# Patient Record
Sex: MALE | Race: OTHER | HISPANIC OR LATINO | URBAN - METROPOLITAN AREA
[De-identification: names, ages, dates, MRNs, and addresses within clinical notes are randomized per-mention and may not be internally consistent; named-entity substitution may affect disease eponyms.]

---

## 2017-09-23 ENCOUNTER — EMERGENCY (EMERGENCY)
Facility: HOSPITAL | Age: 57
LOS: 0 days | Discharge: HOME | End: 2017-09-23
Attending: EMERGENCY MEDICINE
Payer: SELF-PAY

## 2017-09-23 VITALS
HEIGHT: 66 IN | HEART RATE: 99 BPM | SYSTOLIC BLOOD PRESSURE: 156 MMHG | WEIGHT: 169.98 LBS | OXYGEN SATURATION: 99 % | RESPIRATION RATE: 19 BRPM | DIASTOLIC BLOOD PRESSURE: 87 MMHG | TEMPERATURE: 98 F

## 2017-09-23 DIAGNOSIS — M79.662 PAIN IN LEFT LOWER LEG: ICD-10-CM

## 2017-09-23 DIAGNOSIS — M54.16 RADICULOPATHY, LUMBAR REGION: ICD-10-CM

## 2017-09-23 DIAGNOSIS — M54.5 LOW BACK PAIN: ICD-10-CM

## 2017-09-23 PROCEDURE — 99284 EMERGENCY DEPT VISIT MOD MDM: CPT

## 2017-09-23 PROCEDURE — 72100 X-RAY EXAM L-S SPINE 2/3 VWS: CPT | Mod: 26

## 2017-09-23 RX ORDER — METHOCARBAMOL 500 MG/1
1500 TABLET, FILM COATED ORAL ONCE
Qty: 0 | Refills: 0 | Status: COMPLETED | OUTPATIENT
Start: 2017-09-23 | End: 2017-09-23

## 2017-09-23 RX ORDER — METHOCARBAMOL 500 MG/1
1 TABLET, FILM COATED ORAL
Qty: 15 | Refills: 0 | OUTPATIENT
Start: 2017-09-23 | End: 2017-09-28

## 2017-09-23 RX ORDER — KETOROLAC TROMETHAMINE 30 MG/ML
60 SYRINGE (ML) INJECTION ONCE
Qty: 0 | Refills: 0 | Status: DISCONTINUED | OUTPATIENT
Start: 2017-09-23 | End: 2017-09-23

## 2017-09-23 RX ADMIN — Medication 60 MILLIGRAM(S): at 22:57

## 2017-09-23 RX ADMIN — METHOCARBAMOL 1500 MILLIGRAM(S): 500 TABLET, FILM COATED ORAL at 22:19

## 2017-09-23 RX ADMIN — Medication 60 MILLIGRAM(S): at 22:19

## 2017-09-23 NOTE — ED PROVIDER NOTE - MUSCULOSKELETAL MINIMAL EXAM
motor intact/Left para lumbar and sacral muscles at levels of L3 to S1/TENDERNESS/MUSCLE SPASMS/normal range of motion/neck supple

## 2017-09-23 NOTE — ED ADULT NURSE NOTE - ADDITIONAL PRINTED INSTRUCTIONS GIVEN
discharged home, instructed to follow up with Dr Quinn phillips,, verbalized understanding of instructions

## 2017-09-23 NOTE — ED ADULT TRIAGE NOTE - CHIEF COMPLAINT QUOTE
Pt states he is sick, he is having pain in his left flank and pain down his leg into his foot causing numbness and tingling. My back, my hip, my leg, my toes, my foot just hurt.  ID [id number]

## 2017-09-23 NOTE — ED PROVIDER NOTE - OBJECTIVE STATEMENT
57 years old male walked in c/o left lower back pain radiates to left buttock and left leg for 10 days increases to get up. Pt denies recent trauma, focal/distal weakness, uncontrolled urinations or bowel movements, abd pain, nausea, vomiting, dysuria or hematuria.

## 2017-09-23 NOTE — ED ADULT NURSE NOTE - CHIEF COMPLAINT QUOTE
Pt states he is sick, he is having pain in his left flank and pain down his leg into his foot causing numbness and tingling. My back, my hip, my leg, my toes, my foot just hurt.  ID [id number]

## 2017-09-23 NOTE — ED PROVIDER NOTE - WEIGHT BEARING
pt is ambulating with normal gaits without assists, pt is able to perform leg raising on standing position bilaterally/able

## 2017-09-23 NOTE — ED ADULT NURSE NOTE - OBJECTIVE STATEMENT
patient stated that back pain started for about 10 days got worse today, pain of 10 on a scale of 0 to 10

## 2017-09-23 NOTE — ED PROVIDER NOTE - CONSTITUTIONAL, MLM
normal... Well appearing, well nourished, awake, alert, oriented to person, place, time/situation and in no apparent distress. Speaking in clear full sentences smiling not holding his back .

## 2022-08-25 NOTE — ED PROVIDER NOTE - CALF TENDERNESS
Health Maintenance Due   Topic Date Due   • Hepatitis B Vaccine (1 of 3 - 3-dose series) Never done   • Colorectal Cancer Screen-  Never done   • Shingles Vaccine (1 of 2) Never done       Patient is due for topics as listed above but is not proceeding with Immunization(s) Hep B and Shingles and Colorectal Cancer Screening: Colonoscopy at this time. Education provided for Immunization(s) Hep B and Shingles and Colorectal Cancer Screening: Colonoscopy.    Patient has given consent to record this visit for documentation in their clinical record.     none

## 2023-01-03 ENCOUNTER — OUTPATIENT (OUTPATIENT)
Dept: OUTPATIENT SERVICES | Facility: HOSPITAL | Age: 63
LOS: 1 days | End: 2023-01-03

## 2023-01-03 ENCOUNTER — APPOINTMENT (OUTPATIENT)
Age: 63
End: 2023-01-03
Payer: SELF-PAY

## 2023-01-03 VITALS
HEART RATE: 90 BPM | BODY MASS INDEX: 31.76 KG/M2 | DIASTOLIC BLOOD PRESSURE: 86 MMHG | WEIGHT: 186 LBS | OXYGEN SATURATION: 97 % | RESPIRATION RATE: 14 BRPM | SYSTOLIC BLOOD PRESSURE: 127 MMHG | HEIGHT: 64 IN

## 2023-01-03 PROCEDURE — 99203 OFFICE O/P NEW LOW 30 MIN: CPT

## 2023-01-03 NOTE — PLAN
[FreeTextEntry1] : 63 y/o m. here for annual visit.\par \par B/L knee pain, most likely OA: Patient given acetaminophen 325 mg as needed  20 tablets given.\par \par Healthy eating, exercise, safe sex.\par Labs today\par \par F/U in 2 weeks.

## 2023-01-03 NOTE — REVIEW OF SYSTEMS
[Joint Pain] : joint pain [Negative] : Heme/Lymph [Patient Intake Form Reviewed] : Patient intake form was reviewed [FreeTextEntry9] : on b/l knee only

## 2023-01-03 NOTE — PHYSICAL EXAM
[Normal] : no posterior cervical lymphadenopathy and no anterior cervical lymphadenopathy [de-identified] : left knee deformity

## 2023-01-03 NOTE — HISTORY OF PRESENT ILLNESS
[de-identified] : 63 y/o m. with no significant past medical history here today for annual wellness visit. \par c/o B/l knee pain with clicking for 6 months, pain is worse in left knee. Pain is worse in the morning and improves throughout the day with use. Denies any trauma or incident causing this pain. Pain radiates down entire leg when he gets up from a seated position. He takes Ibuprofen as needed for the pain and it relieves the pain. Denies swelling, cramping, muscle pain, or associated fever.

## 2023-01-03 NOTE — HEALTH RISK ASSESSMENT
[Good] : ~his/her~ current health as good [Very Good] : ~his/her~  mood as very good [Never] : Never [0-4] : 0-4 [1 or 2 (0 pts)] : 1 or 2 (0 points) [Never (0 pts)] : Never (0 points) [No] : In the past 12 months have you used drugs other than those required for medical reasons? No [No falls in past year] : Patient reported no falls in the past year [0] : 2) Feeling down, depressed, or hopeless: Not at all (0) [PHQ-2 Negative - No further assessment needed] : PHQ-2 Negative - No further assessment needed [HIV Test offered] : HIV Test offered [Hepatitis C test offered] : Hepatitis C test offered [# of Members in Household ___] :  household currently consist of [unfilled] member(s) [Employed] : employed [Less Than High School] : less than high school [Single] : single [# Of Children ___] : has [unfilled] children [Sexually Active] : sexually active [Feels Safe at Home] : Feels safe at home [Fully functional (bathing, dressing, toileting, transferring, walking, feeding)] : Fully functional (bathing, dressing, toileting, transferring, walking, feeding) [Fully functional (using the telephone, shopping, preparing meals, housekeeping, doing laundry, using] : Fully functional and needs no help or supervision to perform IADLs (using the telephone, shopping, preparing meals, housekeeping, doing laundry, using transportation, managing medications and managing finances) [Reports normal functional visual acuity (ie: able to read med bottle)] : Reports normal functional visual acuity [Smoke Detector] : smoke detector [Carbon Monoxide Detector] : carbon monoxide detector [Safety elements used in home] : safety elements used in home [Seat Belt] :  uses seat belt [Sunscreen] : uses sunscreen [With Patient/Caregiver] : , with patient/caregiver [Reviewed updated] : Reviewed, updated [Designated Healthcare Proxy] : Designated healthcare proxy [Name: ___] : Health Care Proxy's Name: [unfilled]  [Aggressive treatment] : aggressive treatment [Transportation] : transportation [Financial] : financial [I will adhere to the patient's wishes.] : I will adhere to the patient's wishes. [Time Spent: ___ minutes] : Time Spent: [unfilled] minutes [PHQ-9 Negative - No further assessment needed] : PHQ-9 Negative - No further assessment needed [Access to Medications] : access to medications [Patient reported colonoscopy was normal] : Patient reported colonoscopy was normal [FreeTextEntry1] : knee pain [Audit-CScore] : 0 [de-identified] : none [de-identified] : chicken, rice, and beans [[id number]] : 0 [Change in mental status noted] : No change in mental status noted [Language] : denies difficulty with language [Behavior] : denies difficulty with behavior [Learning/Retaining New Information] : denies difficulty learning/retaining new information [Handling Complex Tasks] : denies difficulty handling complex tasks [Reasoning] : denies difficulty with reasoning [Spatial Ability and Orientation] : denies difficulty with spatial ability and orientation [High Risk Behavior] : no high risk behavior [Reports changes in hearing] : Reports no changes in hearing [Reports changes in vision] : Reports no changes in vision [Reports changes in dental health] : Reports no changes in dental health [Guns at Home] : no guns at home [Travel to Developing Areas] : does not  travel to developing areas [TB Exposure] : is not being exposed to tuberculosis [Caregiver Concerns] : does not have caregiver concerns [ColonoscopyComments] : FIT test a year ago [de-identified] : roommate [FreeTextEntry2] :  [de-identified] : 5th grade [AdvancecareDate] : 01/23

## 2023-01-05 DIAGNOSIS — Z78.9 OTHER SPECIFIED HEALTH STATUS: ICD-10-CM

## 2023-01-05 DIAGNOSIS — Z00.00 ENCOUNTER FOR GENERAL ADULT MEDICAL EXAMINATION WITHOUT ABNORMAL FINDINGS: ICD-10-CM

## 2023-01-05 DIAGNOSIS — M25.561 PAIN IN RIGHT KNEE: ICD-10-CM

## 2023-01-06 LAB
ALBUMIN SERPL ELPH-MCNC: 5 G/DL
ALP BLD-CCNC: 116 U/L
ALT SERPL-CCNC: 95 U/L
ANION GAP SERPL CALC-SCNC: 13 MMOL/L
AST SERPL-CCNC: 51 U/L
BASOPHILS # BLD AUTO: 0.03 K/UL
BASOPHILS NFR BLD AUTO: 0.5 %
BILIRUB SERPL-MCNC: 0.5 MG/DL
BUN SERPL-MCNC: 14 MG/DL
CALCIUM SERPL-MCNC: 10 MG/DL
CHLORIDE SERPL-SCNC: 101 MMOL/L
CHOLEST SERPL-MCNC: 157 MG/DL
CO2 SERPL-SCNC: 24 MMOL/L
CREAT SERPL-MCNC: 0.96 MG/DL
EGFR: 90 ML/MIN/1.73M2
EOSINOPHIL # BLD AUTO: 0.08 K/UL
EOSINOPHIL NFR BLD AUTO: 1.2 %
ESTIMATED AVERAGE GLUCOSE: 146 MG/DL
GLUCOSE SERPL-MCNC: 84 MG/DL
HBA1C MFR BLD HPLC: 6.7 %
HCT VFR BLD CALC: 50.4 %
HCV AB SER QL: NONREACTIVE
HCV S/CO RATIO: 0.07 S/CO
HDLC SERPL-MCNC: 51 MG/DL
HGB BLD-MCNC: 17 G/DL
HIV1+2 AB SPEC QL IA.RAPID: NONREACTIVE
IMM GRANULOCYTES NFR BLD AUTO: 0.5 %
LDLC SERPL CALC-MCNC: 86 MG/DL
LYMPHOCYTES # BLD AUTO: 2.48 K/UL
LYMPHOCYTES NFR BLD AUTO: 38.2 %
MAN DIFF?: NORMAL
MCHC RBC-ENTMCNC: 31 PG
MCHC RBC-ENTMCNC: 33.7 GM/DL
MCV RBC AUTO: 91.8 FL
MONOCYTES # BLD AUTO: 0.51 K/UL
MONOCYTES NFR BLD AUTO: 7.8 %
NEUTROPHILS # BLD AUTO: 3.37 K/UL
NEUTROPHILS NFR BLD AUTO: 51.8 %
NONHDLC SERPL-MCNC: 107 MG/DL
PLATELET # BLD AUTO: 342 K/UL
POTASSIUM SERPL-SCNC: 4.4 MMOL/L
PROT SERPL-MCNC: 7.9 G/DL
RBC # BLD: 5.49 M/UL
RBC # FLD: 12 %
SODIUM SERPL-SCNC: 138 MMOL/L
T PALLIDUM AB SER QL IA: NEGATIVE
TRIGL SERPL-MCNC: 102 MG/DL
WBC # FLD AUTO: 6.5 K/UL

## 2023-01-17 ENCOUNTER — RESULT CHARGE (OUTPATIENT)
Age: 63
End: 2023-01-17

## 2023-01-17 ENCOUNTER — APPOINTMENT (OUTPATIENT)
Age: 63
End: 2023-01-17
Payer: SELF-PAY

## 2023-01-17 VITALS
TEMPERATURE: 98.8 F | HEART RATE: 112 BPM | DIASTOLIC BLOOD PRESSURE: 78 MMHG | SYSTOLIC BLOOD PRESSURE: 138 MMHG | RESPIRATION RATE: 14 BRPM | OXYGEN SATURATION: 97 %

## 2023-01-17 DIAGNOSIS — J18.9 PNEUMONIA, UNSPECIFIED ORGANISM: ICD-10-CM

## 2023-01-17 DIAGNOSIS — Z00.00 ENCOUNTER FOR GENERAL ADULT MEDICAL EXAMINATION W/OUT ABNORMAL FINDINGS: ICD-10-CM

## 2023-01-17 DIAGNOSIS — R05.1 ACUTE COUGH: ICD-10-CM

## 2023-01-17 PROCEDURE — 99213 OFFICE O/P EST LOW 20 MIN: CPT

## 2023-01-17 NOTE — PLAN
[FreeTextEntry1] : 62 y o m comes in for a lab f/u, he c/o coughing productive of yellow sputum since yesterday. Patient has new onset diabetes (A1C of 6.7%)\par \par Acute cough\par COVID test and Flu done in clinic, both were negative\par \par Left side pneumonia \par Patient was given 3 Azithromycin 500 mg capsules in clinic, to take one daily x 3 days.\par \par New onset diabetes A1c 6.7\par Patient was given 2 weeks supply of Metformin 500 mg bid in the clinic\par Patient received 2 weeks supply of Simvastatin 20 mg in the clinic\par Urine Cr/microalbumin ordered\par Patient was counseled on healthy eating, exercise, and diabetes management \par RTC in 2 weeks\par \par Pain\par Patient was given Tylenol extra strength in the clinic \par \par Health maintenance\par Patient received FIT test for colon cancer screening \par \par PHQ-2 negative\par \par Counseled on healthy eating, exercise, and safe sex

## 2023-01-17 NOTE — REVIEW OF SYSTEMS
[Fever] : fever [Cough] : cough [Negative] : Psychiatric [Hot Flashes] : no hot flashes [Night Sweats] : no night sweats [Recent Change In Weight] : ~T no recent weight change [FreeTextEntry2] : self reported fever yesterday [FreeTextEntry6] : productive cough

## 2023-01-17 NOTE — HISTORY OF PRESENT ILLNESS
[de-identified] : 62 y o m comes in for a lab f/u, he c/o coughing productive of yellow sputum since yesterday. Patient has new onset diabetes (A1c of 6.7%).

## 2023-01-29 ENCOUNTER — LABORATORY RESULT (OUTPATIENT)
Age: 63
End: 2023-01-29

## 2023-01-31 ENCOUNTER — APPOINTMENT (OUTPATIENT)
Age: 63
End: 2023-01-31
Payer: SELF-PAY

## 2023-01-31 VITALS
WEIGHT: 186 LBS | HEART RATE: 90 BPM | DIASTOLIC BLOOD PRESSURE: 80 MMHG | SYSTOLIC BLOOD PRESSURE: 143 MMHG | RESPIRATION RATE: 14 BRPM | BODY MASS INDEX: 31.93 KG/M2 | OXYGEN SATURATION: 97 %

## 2023-01-31 PROCEDURE — 99213 OFFICE O/P EST LOW 20 MIN: CPT

## 2023-01-31 RX ORDER — IBUPROFEN 600 MG/1
600 TABLET, FILM COATED ORAL EVERY 6 HOURS
Qty: 100 | Refills: 0 | Status: ACTIVE | COMMUNITY
Start: 2023-01-31 | End: 1900-01-01

## 2023-01-31 RX ORDER — SIMVASTATIN 20 MG/1
20 TABLET, FILM COATED ORAL
Qty: 30 | Refills: 5 | Status: ACTIVE | COMMUNITY
Start: 2023-01-31 | End: 1900-01-01

## 2023-03-31 NOTE — PHYSICAL EXAM
[Normal] : affect was normal and insight and judgment were intact [de-identified] : bilateral knee joint tenderness

## 2023-03-31 NOTE — REVIEW OF SYSTEMS
[Fever] : no fever [Hot Flashes] : no hot flashes [Night Sweats] : no night sweats [Recent Change In Weight] : ~T no recent weight change [Cough] : no cough [Negative] : Heme/Lymph [FreeTextEntry9] : bilateral knee pain

## 2023-03-31 NOTE — PLAN
[FreeTextEntry1] : 62 y o m comes in for a lab f/u, patient has new onset diabetes (A1c of 6.7%). Patient complains of 4 months of bilateral knee pain. \par \par Assessment and Plan\par \par New onset DM\par A1c 6.7% reported 1/3/2023\par ASCVD risk 15.3%\par Patient's lab showed normal albumin/cr \par Start metformin 500mg bid \par Start Simvastatin 20 mg qhs\par \par Bilateral knee pain\par Start Ibuprofen 600mg every 6 hours PRN\par \par Patient counseled on healthy eating, exercise, and safe sex.\par \par RTC in 3 months or sooner as needed.

## 2023-05-11 ENCOUNTER — APPOINTMENT (OUTPATIENT)
Age: 63
End: 2023-05-11
Payer: SELF-PAY

## 2023-05-11 ENCOUNTER — LABORATORY RESULT (OUTPATIENT)
Age: 63
End: 2023-05-11

## 2023-05-11 VITALS
BODY MASS INDEX: 31.07 KG/M2 | WEIGHT: 181 LBS | DIASTOLIC BLOOD PRESSURE: 73 MMHG | HEART RATE: 87 BPM | OXYGEN SATURATION: 96 % | SYSTOLIC BLOOD PRESSURE: 131 MMHG | RESPIRATION RATE: 14 BRPM

## 2023-05-11 PROCEDURE — 99214 OFFICE O/P EST MOD 30 MIN: CPT

## 2023-05-11 NOTE — HISTORY OF PRESENT ILLNESS
[FreeTextEntry1] : f/u  [de-identified] : 62 y o m comes in for a lab f/u, has hx of DM, HLD. Patient states he ran out of his DM medications (metformin 500mg BID) and HLD (simvastatin 20mg). \par ROS negative except for still complaining of b/l knee pain. States that ibuprofen did help. \par Denies any other pain- no chest pain, no sob, no respiratory problems.

## 2023-05-11 NOTE — PLAN
[FreeTextEntry1] : HLD\par -will order lipid panel \par \par DM2\par -will repeat HgA1C\par -gave refills of Metformin 1000mg dialy for 10 days\par \par B/L knee pain\par -referral for xrays given to r/o OA \par \par Spoke about healthy diet/exercise \par \par RTC in 1 week for annual physical exam \par \par

## 2023-05-15 LAB
ALBUMIN SERPL ELPH-MCNC: 4.7 G/DL
ALP BLD-CCNC: 99 U/L
ALT SERPL-CCNC: 64 U/L
ANION GAP SERPL CALC-SCNC: 12 MMOL/L
AST SERPL-CCNC: 45 U/L
BASOPHILS # BLD AUTO: 0.03 K/UL
BASOPHILS NFR BLD AUTO: 0.4 %
BILIRUB SERPL-MCNC: 0.4 MG/DL
BUN SERPL-MCNC: 17 MG/DL
CALCIUM SERPL-MCNC: 9.2 MG/DL
CHLORIDE SERPL-SCNC: 104 MMOL/L
CHOLEST SERPL-MCNC: 170 MG/DL
CO2 SERPL-SCNC: 25 MMOL/L
CREAT SERPL-MCNC: 0.95 MG/DL
CREAT SPEC-SCNC: 324 MG/DL
EGFR: 92 ML/MIN/1.73M2
EOSINOPHIL # BLD AUTO: 0.11 K/UL
EOSINOPHIL NFR BLD AUTO: 1.6 %
ESTIMATED AVERAGE GLUCOSE: 131 MG/DL
GLUCOSE SERPL-MCNC: 133 MG/DL
HBA1C MFR BLD HPLC: 6.2 %
HCT VFR BLD CALC: 45.5 %
HDLC SERPL-MCNC: 37 MG/DL
HGB BLD-MCNC: 15.2 G/DL
IMM GRANULOCYTES NFR BLD AUTO: 1 %
LDLC SERPL CALC-MCNC: NORMAL MG/DL
LYMPHOCYTES # BLD AUTO: 2.46 K/UL
LYMPHOCYTES NFR BLD AUTO: 36.4 %
MAN DIFF?: NORMAL
MCHC RBC-ENTMCNC: 31.3 PG
MCHC RBC-ENTMCNC: 33.4 GM/DL
MCV RBC AUTO: 93.6 FL
MICROALBUMIN 24H UR DL<=1MG/L-MCNC: 3.5 MG/DL
MICROALBUMIN/CREAT 24H UR-RTO: 11 MG/G
MONOCYTES # BLD AUTO: 0.49 K/UL
MONOCYTES NFR BLD AUTO: 7.3 %
NEUTROPHILS # BLD AUTO: 3.59 K/UL
NEUTROPHILS NFR BLD AUTO: 53.3 %
NONHDLC SERPL-MCNC: 133 MG/DL
PLATELET # BLD AUTO: 254 K/UL
POTASSIUM SERPL-SCNC: 4.7 MMOL/L
PROT SERPL-MCNC: 7.4 G/DL
RBC # BLD: 4.86 M/UL
RBC # FLD: 12.3 %
SARS-COV-2 AG RESP QL IA.RAPID: NEGATIVE
SODIUM SERPL-SCNC: 141 MMOL/L
TRIGL SERPL-MCNC: 409 MG/DL
WBC # FLD AUTO: 6.75 K/UL

## 2023-05-18 ENCOUNTER — APPOINTMENT (OUTPATIENT)
Age: 63
End: 2023-05-18
Payer: SELF-PAY

## 2023-05-18 VITALS
WEIGHT: 181 LBS | BODY MASS INDEX: 31.07 KG/M2 | SYSTOLIC BLOOD PRESSURE: 132 MMHG | OXYGEN SATURATION: 97 % | HEART RATE: 98 BPM | RESPIRATION RATE: 14 BRPM | DIASTOLIC BLOOD PRESSURE: 82 MMHG

## 2023-05-18 PROCEDURE — 99396 PREV VISIT EST AGE 40-64: CPT

## 2023-05-18 RX ORDER — METFORMIN HYDROCHLORIDE 500 MG/1
500 TABLET, COATED ORAL
Qty: 60 | Refills: 5 | Status: ACTIVE | COMMUNITY
Start: 2023-01-31 | End: 1900-01-01

## 2023-05-18 NOTE — PLAN
[FreeTextEntry1] : DM-at goal, HgA1C improved \par Continue Metformin -sent to pharmacy with refills\par \par Mildly elevated LFTs\par Hypertriglyceridemia\par -pt ate before obtaining lipid panel---plan will be to repeat lipid panel and hold Simvastatin for now due to elevated LFTs., Pt denies alcohol usage \par \par B/l Knee pain\par -pt awaiting xrays\par -asked to buy voltaran gel otc for arthritis \par \par RTC fasting for repeat Lipid panel

## 2023-05-18 NOTE — HEALTH RISK ASSESSMENT
[HIV Test offered] : HIV Test offered [Hepatitis C test offered] : Hepatitis C test offered [None] : None [Alone] : lives alone [Employed] : employed [Less Than High School] : less than high school [Significant Other] : lives with significant other [# Of Children ___] : has [unfilled] children [Sexually Active] : sexually active [Feels Safe at Home] : Feels safe at home [Smoke Detector] : smoke detector [Carbon Monoxide Detector] : carbon monoxide detector [Guns at Home] : guns at home [Seat Belt] :  uses seat belt [Sunscreen] : uses sunscreen [Never] : Never [Good] : ~his/her~ current health as good [Change in mental status noted] : No change in mental status noted [Language] : denies difficulty with language [Reports changes in hearing] : Reports no changes in hearing [Reports changes in vision] : Reports no changes in vision [Reports changes in dental health] : Reports no changes in dental health [ColonoscopyComments] : pt declines FIT test was neg from  1/23 [HIVComments] : rimma from 1/23 [HepatitisCComments] : rimma from 1/23 [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

## 2023-05-18 NOTE — PHYSICAL EXAM
[Normal] : affect was normal and insight and judgment were intact [de-identified] : knee crepitus heard in both knees

## 2023-05-19 ENCOUNTER — OUTPATIENT (OUTPATIENT)
Dept: OUTPATIENT SERVICES | Facility: HOSPITAL | Age: 63
LOS: 1 days | End: 2023-05-19

## 2023-05-19 ENCOUNTER — APPOINTMENT (OUTPATIENT)
Age: 63
End: 2023-05-19
Payer: SELF-PAY

## 2023-05-19 DIAGNOSIS — E11.9 TYPE 2 DIABETES MELLITUS W/OUT COMPLICATIONS: ICD-10-CM

## 2023-05-19 DIAGNOSIS — M25.561 PAIN IN RIGHT KNEE: ICD-10-CM

## 2023-05-19 DIAGNOSIS — M25.562 PAIN IN RIGHT KNEE: ICD-10-CM

## 2023-05-19 PROCEDURE — 99212 OFFICE O/P EST SF 10 MIN: CPT

## 2023-05-22 LAB
CHOLEST SERPL-MCNC: 159 MG/DL
HDLC SERPL-MCNC: 46 MG/DL
LDLC SERPL CALC-MCNC: 90 MG/DL
NONHDLC SERPL-MCNC: 113 MG/DL
TRIGL SERPL-MCNC: 116 MG/DL

## 2023-05-26 ENCOUNTER — APPOINTMENT (OUTPATIENT)
Age: 63
End: 2023-05-26

## 2023-06-05 ENCOUNTER — APPOINTMENT (OUTPATIENT)
Age: 63
End: 2023-06-05
Payer: SELF-PAY

## 2023-06-05 ENCOUNTER — OUTPATIENT (OUTPATIENT)
Dept: OUTPATIENT SERVICES | Facility: HOSPITAL | Age: 63
LOS: 1 days | End: 2023-06-05

## 2023-06-05 VITALS
BODY MASS INDEX: 32.61 KG/M2 | RESPIRATION RATE: 14 BRPM | HEART RATE: 88 BPM | WEIGHT: 190 LBS | OXYGEN SATURATION: 97 % | DIASTOLIC BLOOD PRESSURE: 77 MMHG | SYSTOLIC BLOOD PRESSURE: 116 MMHG

## 2023-06-05 DIAGNOSIS — Z13.228 ENCOUNTER FOR SCREENING FOR OTHER METABOLIC DISORDERS: ICD-10-CM

## 2023-06-05 PROCEDURE — 99213 OFFICE O/P EST LOW 20 MIN: CPT

## 2023-06-05 NOTE — HISTORY OF PRESENT ILLNESS
[de-identified] : 61 yo M presenting to follow up blood work results. His lipid profile was repeated due to not fasting the 1st time.

## 2023-06-19 ENCOUNTER — OUTPATIENT (OUTPATIENT)
Dept: OUTPATIENT SERVICES | Facility: HOSPITAL | Age: 63
LOS: 1 days | End: 2023-06-19

## 2023-06-19 ENCOUNTER — APPOINTMENT (OUTPATIENT)
Age: 63
End: 2023-06-19
Payer: SELF-PAY

## 2023-06-19 VITALS
HEIGHT: 64 IN | SYSTOLIC BLOOD PRESSURE: 134 MMHG | HEART RATE: 92 BPM | DIASTOLIC BLOOD PRESSURE: 77 MMHG | WEIGHT: 190 LBS | BODY MASS INDEX: 32.44 KG/M2

## 2023-06-19 DIAGNOSIS — R74.01 ELEVATION OF LEVELS OF LIVER TRANSAMINASE LEVELS: ICD-10-CM

## 2023-06-19 PROCEDURE — 99213 OFFICE O/P EST LOW 20 MIN: CPT

## 2023-06-19 NOTE — PLAN
[FreeTextEntry1] : Lipid panel reviewed- wnl\par CMP reviewd from last visit- mildly elevated LFTs\par \par Pt asked to stop taking Simvastatin for now, will repeat CMP \par \par DM- [id number]%- continue taking metformin \par \par RTC on Thursday for f/u

## 2023-06-19 NOTE — HISTORY OF PRESENT ILLNESS
[FreeTextEntry1] : f/u  [de-identified] : 61 yo M with PMH of DM (on metformin) and HLD (on simvastatin) comes for f/u. Patient feels well. \par ROS -complaining of b/l knee pain \par \par

## 2023-06-22 ENCOUNTER — APPOINTMENT (OUTPATIENT)
Age: 63
End: 2023-06-22

## 2023-06-22 LAB
ALBUMIN SERPL ELPH-MCNC: 4.6 G/DL
ALP BLD-CCNC: 108 U/L
ALT SERPL-CCNC: 50 U/L
ANION GAP SERPL CALC-SCNC: 11 MMOL/L
AST SERPL-CCNC: 31 U/L
BILIRUB SERPL-MCNC: 0.3 MG/DL
BUN SERPL-MCNC: 21 MG/DL
CALCIUM SERPL-MCNC: 9.1 MG/DL
CHLORIDE SERPL-SCNC: 105 MMOL/L
CO2 SERPL-SCNC: 22 MMOL/L
CREAT SERPL-MCNC: 0.88 MG/DL
EGFR: 98 ML/MIN/1.73M2
GLUCOSE SERPL-MCNC: 184 MG/DL
POTASSIUM SERPL-SCNC: 4.2 MMOL/L
PROT SERPL-MCNC: 7 G/DL
SODIUM SERPL-SCNC: 139 MMOL/L

## 2023-06-28 DIAGNOSIS — R74.01 ELEVATION OF LEVELS OF LIVER TRANSAMINASE LEVELS: ICD-10-CM

## 2023-07-21 NOTE — PHYSICAL EXAM
[Normal] : affect was normal and insight and judgment were intact [de-identified] : bilateral knee joint tenderness

## 2023-07-21 NOTE — REVIEW OF SYSTEMS
[Negative] : Heme/Lymph [Fever] : no fever [Hot Flashes] : no hot flashes [Night Sweats] : no night sweats [Recent Change In Weight] : ~T no recent weight change [Cough] : no cough [FreeTextEntry9] : bilateral knee pain

## 2023-07-21 NOTE — PLAN
[FreeTextEntry1] : 62 y.o. M with pmhx of DM, HLD, b/l knee pain here to complete blood work.\par \par PCP ordered labs for today's visit\par \par RTC in 2 weeks to discuss results with PCP.

## 2023-07-21 NOTE — HISTORY OF PRESENT ILLNESS
[de-identified] : 62 y.o. M with hx of DM, HLD, b/l knee pain, here for to complete blood work ordered by his PCP. He has no complaints.

## 2023-07-24 DIAGNOSIS — M25.561 PAIN IN RIGHT KNEE: ICD-10-CM

## 2023-07-24 DIAGNOSIS — E11.9 TYPE 2 DIABETES MELLITUS WITHOUT COMPLICATIONS: ICD-10-CM

## 2023-08-25 DIAGNOSIS — Z13.228 ENCOUNTER FOR SCREENING FOR OTHER METABOLIC DISORDERS: ICD-10-CM

## 2025-06-17 ENCOUNTER — APPOINTMENT (OUTPATIENT)
Age: 65
End: 2025-06-17
Payer: SELF-PAY

## 2025-06-17 ENCOUNTER — OUTPATIENT (OUTPATIENT)
Dept: OUTPATIENT SERVICES | Facility: HOSPITAL | Age: 65
LOS: 1 days | End: 2025-06-17

## 2025-06-17 VITALS
OXYGEN SATURATION: 95 % | BODY MASS INDEX: 33.47 KG/M2 | TEMPERATURE: 15 F | RESPIRATION RATE: 15 BRPM | WEIGHT: 195 LBS | HEART RATE: 87 BPM | DIASTOLIC BLOOD PRESSURE: 79 MMHG | SYSTOLIC BLOOD PRESSURE: 147 MMHG

## 2025-06-17 PROBLEM — J30.1 SEASONAL ALLERGIC RHINITIS DUE TO POLLEN: Status: ACTIVE | Noted: 2025-06-17

## 2025-06-17 PROCEDURE — 99213 OFFICE O/P EST LOW 20 MIN: CPT

## 2025-06-18 DIAGNOSIS — J30.1 ALLERGIC RHINITIS DUE TO POLLEN: ICD-10-CM

## 2025-06-20 ENCOUNTER — APPOINTMENT (OUTPATIENT)
Age: 65
End: 2025-06-20

## 2025-06-20 ENCOUNTER — OUTPATIENT (OUTPATIENT)
Dept: OUTPATIENT SERVICES | Facility: HOSPITAL | Age: 65
LOS: 1 days | End: 2025-06-20

## 2025-06-20 VITALS
TEMPERATURE: 97.7 F | HEART RATE: 86 BPM | OXYGEN SATURATION: 97 % | WEIGHT: 194 LBS | BODY MASS INDEX: 33.3 KG/M2 | RESPIRATION RATE: 15 BRPM | DIASTOLIC BLOOD PRESSURE: 78 MMHG | SYSTOLIC BLOOD PRESSURE: 132 MMHG

## 2025-06-20 PROBLEM — Z78.9 CAFFEINE USE: Status: ACTIVE | Noted: 2025-06-20

## 2025-06-20 PROBLEM — Z23 NEED FOR TETANUS BOOSTER: Status: ACTIVE | Noted: 2025-06-20

## 2025-06-20 PROBLEM — Z23 NEED FOR DIPHTHERIA-TETANUS-PERTUSSIS (TDAP) VACCINE: Status: ACTIVE | Noted: 2025-06-20

## 2025-06-20 PROCEDURE — 99000 SPECIMEN HANDLING OFFICE-LAB: CPT

## 2025-06-20 PROCEDURE — 99396 PREV VISIT EST AGE 40-64: CPT

## 2025-06-26 LAB
ALBUMIN SERPL ELPH-MCNC: 4.7 G/DL
ALP BLD-CCNC: 118 U/L
ALT SERPL-CCNC: 48 U/L
ANION GAP SERPL CALC-SCNC: 11 MMOL/L
AST SERPL-CCNC: 36 U/L
BILIRUB SERPL-MCNC: 0.3 MG/DL
BUN SERPL-MCNC: 13 MG/DL
CALCIUM SERPL-MCNC: 9.2 MG/DL
CHLORIDE SERPL-SCNC: 106 MMOL/L
CHOLEST SERPL-MCNC: 168 MG/DL
CO2 SERPL-SCNC: 25 MMOL/L
CREAT SERPL-MCNC: 0.99 MG/DL
CREAT SPEC-SCNC: 163 MG/DL
EGFRCR SERPLBLD CKD-EPI 2021: 86 ML/MIN/1.73M2
ESTIMATED AVERAGE GLUCOSE: 160 MG/DL
GLUCOSE SERPL-MCNC: 89 MG/DL
HBA1C MFR BLD HPLC: 7.2 %
HCT VFR BLD CALC: 44.8 %
HCV AB SER QL: NONREACTIVE
HCV S/CO RATIO: 0.13 S/CO
HDLC SERPL-MCNC: 38 MG/DL
HGB BLD-MCNC: 14.7 G/DL
HIV1+2 AB SPEC QL IA.RAPID: NONREACTIVE
LDLC SERPL-MCNC: 88 MG/DL
MCHC RBC-ENTMCNC: 30.7 PG
MCHC RBC-ENTMCNC: 32.8 G/DL
MCV RBC AUTO: 93.5 FL
MICROALBUMIN 24H UR DL<=1MG/L-MCNC: 1.3 MG/DL
MICROALBUMIN/CREAT 24H UR-RTO: 8 MG/G
NONHDLC SERPL-MCNC: 130 MG/DL
PLATELET # BLD AUTO: 282 K/UL
POTASSIUM SERPL-SCNC: 4.5 MMOL/L
PROT SERPL-MCNC: 7.4 G/DL
RBC # BLD: 4.79 M/UL
RBC # FLD: 12.4 %
SODIUM SERPL-SCNC: 142 MMOL/L
T PALLIDUM AB SER QL IA: NEGATIVE
TRIGL SERPL-MCNC: 246 MG/DL
WBC # FLD AUTO: 6.32 K/UL

## 2025-06-27 ENCOUNTER — APPOINTMENT (OUTPATIENT)
Age: 65
End: 2025-06-27
Payer: SELF-PAY

## 2025-06-27 ENCOUNTER — OUTPATIENT (OUTPATIENT)
Dept: OUTPATIENT SERVICES | Facility: HOSPITAL | Age: 65
LOS: 1 days | End: 2025-06-27

## 2025-06-27 VITALS — DIASTOLIC BLOOD PRESSURE: 69 MMHG | HEART RATE: 94 BPM | OXYGEN SATURATION: 96 % | SYSTOLIC BLOOD PRESSURE: 127 MMHG

## 2025-06-27 DIAGNOSIS — E78.5 HYPERLIPIDEMIA, UNSPECIFIED: ICD-10-CM

## 2025-06-27 DIAGNOSIS — Z12.11 ENCOUNTER FOR SCREENING FOR MALIGNANT NEOPLASM OF COLON: ICD-10-CM

## 2025-06-27 DIAGNOSIS — E11.9 TYPE 2 DIABETES MELLITUS WITHOUT COMPLICATIONS: ICD-10-CM

## 2025-06-27 PROCEDURE — 99214 OFFICE O/P EST MOD 30 MIN: CPT

## 2025-06-27 RX ORDER — GLIPIZIDE AND METFORMIN HYDROCHLORIDE 5; 500 MG/1; MG/1
5-500 TABLET, FILM COATED ORAL
Qty: 60 | Refills: 2 | Status: ACTIVE | COMMUNITY
Start: 2025-06-27 | End: 1900-01-01

## 2025-07-17 PROBLEM — E78.5 HYPERLIPIDEMIA: Status: ACTIVE | Noted: 2025-06-27

## 2025-07-24 DIAGNOSIS — Z23 ENCOUNTER FOR IMMUNIZATION: ICD-10-CM

## 2025-07-24 DIAGNOSIS — Z00.00 ENCOUNTER FOR GENERAL ADULT MEDICAL EXAMINATION WITHOUT ABNORMAL FINDINGS: ICD-10-CM

## 2025-07-24 DIAGNOSIS — E11.9 TYPE 2 DIABETES MELLITUS WITHOUT COMPLICATIONS: ICD-10-CM

## 2025-07-24 DIAGNOSIS — E78.5 HYPERLIPIDEMIA, UNSPECIFIED: ICD-10-CM

## 2025-09-19 ENCOUNTER — APPOINTMENT (OUTPATIENT)
Age: 65
End: 2025-09-19
Payer: SELF-PAY

## 2025-09-19 DIAGNOSIS — R05.8 OTHER SPECIFIED COUGH: ICD-10-CM

## 2025-09-19 DIAGNOSIS — B37.0 CANDIDAL STOMATITIS: ICD-10-CM

## 2025-09-19 DIAGNOSIS — R05.3 CHRONIC COUGH: ICD-10-CM

## 2025-09-19 PROCEDURE — 99214 OFFICE O/P EST MOD 30 MIN: CPT

## 2025-09-23 LAB
ALBUMIN SERPL ELPH-MCNC: 4.7 G/DL
ALP BLD-CCNC: 103 U/L
ALT SERPL-CCNC: 44 U/L
ANION GAP SERPL CALC-SCNC: 12 MMOL/L
AST SERPL-CCNC: 31 U/L
BASOPHILS # BLD AUTO: 0.04 K/UL
BASOPHILS NFR BLD AUTO: 0.7 %
BILIRUB SERPL-MCNC: 0.4 MG/DL
BUN SERPL-MCNC: 15 MG/DL
CALCIUM SERPL-MCNC: 9.4 MG/DL
CHLORIDE SERPL-SCNC: 105 MMOL/L
CHOLEST SERPL-MCNC: 165 MG/DL
CO2 SERPL-SCNC: 23 MMOL/L
CREAT SERPL-MCNC: 1.05 MG/DL
EGFRCR SERPLBLD CKD-EPI 2021: 80 ML/MIN/1.73M2
EOSINOPHIL # BLD AUTO: 0.03 K/UL
EOSINOPHIL NFR BLD AUTO: 0.5 %
ESTIMATED AVERAGE GLUCOSE: 140 MG/DL
GLUCOSE SERPL-MCNC: 95 MG/DL
HBA1C MFR BLD HPLC: 6.5 %
HCT VFR BLD CALC: 44.7 %
HDLC SERPL-MCNC: 45 MG/DL
HGB BLD-MCNC: 14.7 G/DL
IMM GRANULOCYTES NFR BLD AUTO: 0.4 %
LDLC SERPL-MCNC: 101 MG/DL
LYMPHOCYTES # BLD AUTO: 1.95 K/UL
LYMPHOCYTES NFR BLD AUTO: 34.6 %
MAN DIFF?: NORMAL
MCHC RBC-ENTMCNC: 30.4 PG
MCHC RBC-ENTMCNC: 32.9 G/DL
MCV RBC AUTO: 92.4 FL
MONOCYTES # BLD AUTO: 0.32 K/UL
MONOCYTES NFR BLD AUTO: 5.7 %
NEUTROPHILS # BLD AUTO: 3.28 K/UL
NEUTROPHILS NFR BLD AUTO: 58.1 %
NONHDLC SERPL-MCNC: 120 MG/DL
PLATELET # BLD AUTO: 273 K/UL
POTASSIUM SERPL-SCNC: 4.9 MMOL/L
PROT SERPL-MCNC: 7.4 G/DL
RBC # BLD: 4.84 M/UL
RBC # FLD: 12.5 %
SODIUM SERPL-SCNC: 140 MMOL/L
TRIGL SERPL-MCNC: 105 MG/DL
WBC # FLD AUTO: 5.64 K/UL